# Patient Record
Sex: MALE | Race: WHITE
[De-identification: names, ages, dates, MRNs, and addresses within clinical notes are randomized per-mention and may not be internally consistent; named-entity substitution may affect disease eponyms.]

---

## 2017-05-03 ENCOUNTER — HOSPITAL ENCOUNTER (EMERGENCY)
Dept: HOSPITAL 47 - EC | Age: 52
Discharge: HOME | End: 2017-05-03
Payer: COMMERCIAL

## 2017-05-03 VITALS
SYSTOLIC BLOOD PRESSURE: 147 MMHG | RESPIRATION RATE: 18 BRPM | TEMPERATURE: 98.5 F | HEART RATE: 65 BPM | DIASTOLIC BLOOD PRESSURE: 86 MMHG

## 2017-05-03 DIAGNOSIS — E78.5: ICD-10-CM

## 2017-05-03 DIAGNOSIS — Z79.899: ICD-10-CM

## 2017-05-03 DIAGNOSIS — T15.02XA: Primary | ICD-10-CM
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
## 2017-05-03 DIAGNOSIS — Z79.82: ICD-10-CM

## 2017-05-03 DIAGNOSIS — Z23: ICD-10-CM
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
## 2017-05-03 DIAGNOSIS — Z87.891: ICD-10-CM

## 2017-05-03 PROCEDURE — 65220 REMOVE FOREIGN BODY FROM EYE: CPT

## 2017-05-03 PROCEDURE — 90715 TDAP VACCINE 7 YRS/> IM: CPT

## 2017-05-03 PROCEDURE — 90471 IMMUNIZATION ADMIN: CPT

## 2017-05-03 PROCEDURE — 99283 EMERGENCY DEPT VISIT LOW MDM: CPT

## 2017-05-03 NOTE — ED
General Adult HPI





- General


Chief complaint: ENT


Stated complaint: eye injury-[facility]


Time Seen by Provider: 05/03/17 20:25


Source: patient, RN notes reviewed


Mode of arrival: ambulatory


Limitations: no limitations





- History of Present Illness


Initial comments: 





Patient 51-year-old male who presents emergency room today with a chief 

complaint of foreign body to the left eye.  He does admit that he was grinding 

earlier today at work.  States he did have his safety glasses on.  He states he 

did not feel anything get into his eye at the time.  He states it was not until 

he was driving home that he felt some irritation to the left side.  He states it

's seems a metallic foreign body at the 3 o'clock position. Patient denies any 

recent fever, chills, shortness of breath, chest pain, back pain, abdominal pain

, nausea or vomiting, numbness or tingling, dysuria or hematuria, constipation 

or diarrhea, headaches or visual changes, or any other complaints.





- Related Data


 Home Medications











 Medication  Instructions  Recorded  Confirmed


 


Aspirin 81 mg PO DAILY 08/01/14 05/03/17


 


Pravastatin Sodium [Pravachol] 20 mg PO DAILY 08/01/14 05/03/17


 


Ascorbic Acid [Vitamin C] 1,000 mg PO DAILY 05/03/17 05/03/17


 


Cholecalciferol [Vitamin D3] 1,000 unit PO DAILY 05/03/17 05/03/17


 


Ferrous Sulfate [Feosol] 325 mg PO DAILY 05/03/17 05/03/17


 


Multivitamins, Thera [Multivitamin 1 tab PO DAILY 05/03/17 05/03/17





(formulary)]   


 


Omega-3 Fatty Acids/Fish Oil [Fish 1 cap PO DAILY 05/03/17 05/03/17





Oil 1,000 mg Softgel]   








 Previous Rx's











 Medication  Instructions  Recorded


 


Tobramycin 0.3% Ophth Soln [Tobrex 1 - 2 drop BOTH EYES QID 7 Days 05/03/17





0.3% Ophth Soln]  











 Allergies











Allergy/AdvReac Type Severity Reaction Status Date / Time


 


No Known Allergies Allergy   Verified 05/03/17 20:36














Review of Systems


ROS Statement: 


Those systems with pertinent positive or pertinent negative responses have been 

documented in the HPI.





ROS Other: All systems not noted in ROS Statement are negative.





Past Medical History


Past Medical History: Hyperlipidemia


History of Any Multi-Drug Resistant Organisms: None Reported


Past Surgical History: No Surgical Hx Reported


Past Anesthesia/Blood Transfusion Reactions: No Reported Reaction


Past Psychological History: No Psychological Hx Reported


Smoking Status: Former smoker


Past Alcohol Use History: None Reported, Occasional


Past Drug Use History: None Reported





General Exam





- General Exam Comments


Initial Comments: 





General:  The patient is awake and alert, in no distress, and does not appear 

acutely ill. 


Eye:  Pupils are equal, round and reactive to light, extra-ocular movements are 

intact.  No nystagmus.  There is normal conjunctiva bilaterally.  No signs of 

icterus.  


Ears, nose, mouth and throat:  There are moist mucous membranes and no oral 

lesions. 


Neck:  The neck is supple, there is no tenderness or JVD.  


Cardiovascular:  There is a regular rate and rhythm. No murmur, rub or gallop 

is appreciated.


Respiratory:  Lungs are clear to auscultation, respirations are non-labored, 

breath sounds are equal.  No wheezes, stridor, rales, or rhonchi.


Musculoskeletal:  Normal ROM, no tenderness.  Strength 5/5. Sensation intact. 

Pulses equal bilaterally 2+.  


Neurological:  A&O x 3. CN II-XII intact, There are no obvious motor or sensory 

deficits. Coordination appears grossly intact. Speech is normal.


Skin:  Skin is warm and dry and no rashes or lesions are noted. 


Psychiatric:  Cooperative, appropriate mood & affect, normal judgment.  


Limitations: no limitations





Course


 Vital Signs











  05/03/17





  20:12


 


Temperature 98.5 F


 


Pulse Rate 65


 


Respiratory 18





Rate 


 


Blood Pressure 147/86


 


O2 Sat by Pulse 97





Oximetry 














Procedures





- Procedures


Initial comment: 





Patient does have body located at the 3 o'clock position.  Was anesthetized 

locally with proparacaine.  Does relieve his symptoms.  Fleming brush used to 

remove foreign body.  Rust ring removed.  Patient already procedure well.





Disposition


Clinical Impression: 


 Corneal foreign body





Disposition: HOME SELF-CARE


Condition: Good


Additional Instructions: 


Please use antibiotic drops as prescribed follow-up with ophthalmologist falls 

symptoms have not completely resolved in 2 days.


Prescriptions: 


Tobramycin 0.3% Ophth Soln [Tobrex 0.3% Ophth Soln] 1 - 2 drop BOTH EYES QID 7 

Days


Referrals: 


Maricel Sofia MD [Primary Care Provider] - 1-2 days


Pedro Luis Kenny MD [STAFF PHYSICIAN] - 1-2 days


Time of Disposition: 20:52

## 2018-06-22 ENCOUNTER — HOSPITAL ENCOUNTER (EMERGENCY)
Dept: HOSPITAL 47 - EC | Age: 53
Discharge: HOME | End: 2018-06-22
Payer: COMMERCIAL

## 2018-06-22 VITALS
RESPIRATION RATE: 20 BRPM | HEART RATE: 68 BPM | DIASTOLIC BLOOD PRESSURE: 94 MMHG | SYSTOLIC BLOOD PRESSURE: 161 MMHG | TEMPERATURE: 98.9 F

## 2018-06-22 DIAGNOSIS — Z79.82: ICD-10-CM
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
## 2018-06-22 DIAGNOSIS — Z79.899: ICD-10-CM
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
## 2018-06-22 DIAGNOSIS — S61.210A: Primary | ICD-10-CM

## 2018-06-22 DIAGNOSIS — Z87.891: ICD-10-CM

## 2018-06-22 DIAGNOSIS — E78.5: ICD-10-CM

## 2018-06-22 PROCEDURE — 99283 EMERGENCY DEPT VISIT LOW MDM: CPT

## 2018-06-22 PROCEDURE — 73130 X-RAY EXAM OF HAND: CPT

## 2018-06-22 PROCEDURE — 96372 THER/PROPH/DIAG INJ SC/IM: CPT

## 2018-06-22 PROCEDURE — 12005 RPR S/N/A/GEN/TRK12.6-20.0CM: CPT

## 2018-06-22 NOTE — ED
General Adult HPI





- General


Chief complaint: Wound/Laceration


Stated complaint: [facility] rt index lac


Time Seen by Provider: 06/22/18 17:37


Source: patient, RN notes reviewed


Mode of arrival: ambulatory


Limitations: no limitations





- History of Present Illness


Initial comments: 





52-year-old male presents to the emergency department for a chief complaint of 

laceration to the ring finger of the right hand about one hour ago.  Patient 

states he was using a wrench when it got stuck in a motor and slapped his 

finger.  Patient states he lacerated the finger this way.  Patient last had a 

tetanus shot last year.  Patient denies any other injuries. Patient has no 

other complaints at this time including shortness of breath, chest pain, 

abdominal pain, nausea or vomiting, headache, or visual changes.





- Related Data


 Home Medications











 Medication  Instructions  Recorded  Confirmed


 


Aspirin 81 mg PO DAILY 08/01/14 05/03/17


 


Pravastatin Sodium [Pravachol] 20 mg PO DAILY 08/01/14 05/03/17


 


Ascorbic Acid [Vitamin C] 1,000 mg PO DAILY 05/03/17 05/03/17


 


Cholecalciferol [Vitamin D3] 1,000 unit PO DAILY 05/03/17 05/03/17


 


Ferrous Sulfate [Feosol] 325 mg PO DAILY 05/03/17 05/03/17


 


Multivitamins, Thera [Multivitamin 1 tab PO DAILY 05/03/17 05/03/17





(formulary)]   


 


Omega-3 Fatty Acids/Fish Oil [Fish 1 cap PO DAILY 05/03/17 05/03/17





Oil 1,000 mg Softgel]   








 Previous Rx's











 Medication  Instructions  Recorded


 


Tobramycin 0.3% Ophth Soln [Tobrex 1 - 2 drop BOTH EYES QID 7 Days  ml 05/03/17





0.3% Ophth Soln]  


 


Cephalexin [Keflex] 500 mg PO Q12HR #20 cap 06/22/18











 Allergies











Allergy/AdvReac Type Severity Reaction Status Date / Time


 


No Known Allergies Allergy   Verified 06/22/18 16:52














Review of Systems


ROS Statement: 


Those systems with pertinent positive or pertinent negative responses have been 

documented in the HPI.





ROS Other: All systems not noted in ROS Statement are negative.





Past Medical History


Past Medical History: Hyperlipidemia


History of Any Multi-Drug Resistant Organisms: None Reported


Past Surgical History: No Surgical Hx Reported


Past Anesthesia/Blood Transfusion Reactions: No Reported Reaction


Past Psychological History: No Psychological Hx Reported


Smoking Status: Former smoker


Past Alcohol Use History: Occasional


Past Drug Use History: None Reported





General Exam


Limitations: no limitations


General appearance: alert, in no apparent distress


Head exam: Present: atraumatic, normocephalic, normal inspection


Eye exam: Present: normal appearance


ENT exam: Present: normal exam, mucous membranes moist


Neck exam: Present: normal inspection, full ROM.  Absent: tenderness, 

meningismus, lymphadenopathy


Respiratory exam: Present: normal lung sounds bilaterally.  Absent: respiratory 

distress, wheezes, rales, rhonchi, stridor


Cardiovascular Exam: Present: regular rate, normal rhythm, normal heart sounds.

  Absent: systolic murmur, diastolic murmur, rubs, gallop, clicks


Extremities exam: Present: full ROM (Full range of motion of the second digit 

right hand including flexion and extension at the MCP, PIP, and DIP joints.), 

tenderness (Tenderness to the laceration site.), normal capillary refill (

Refill less than 2 seconds and radial pulse 2+ in the right upper extremity), 

other (There is a 13 cm laceration extending in the second and third web space 

of the medial aspect of the finger and along to the palmar aspect of the finger 

into the distal phalanx.  No foreign bodies noted.  All deep structures intact.

  No tendon exposure or bone exposure.).  Absent: joint swelling





Course


 Vital Signs











  06/22/18





  16:49


 


Temperature 98.9 F


 


Pulse Rate 68


 


Respiratory 20





Rate 


 


Blood Pressure 161/94


 


O2 Sat by Pulse 98





Oximetry 














Procedures





- Procedures


Initial comment: 





Body area: Right second digit


Laceration length: 14 cm 


Foreign bodies: no foreign bodies 


Tendon involvement: none 


Nerve involvement: none


 Vascular damage: no


 Anesthesia: local infiltration 


Local anesthetic: 9 mL 1% lidocaine 


Preparation: Patient was prepped and draped in the usual sterile fashion. 


Irrigation solution: 1 L sterile water


Irrigation method:sterile water jet lavage 1 L


Skin closure:5-0 Ethilon using sterile technique


Number of sutures: 32


Technique: 31 interupted, 1 verticle mattress  


Dressing: antibiotic ointment/ gauze 


Patient tolerance: Patient tolerated the procedure well with no immediate 

complications.





Medical Decision Making





- Medical Decision Making





52-year-old male presents to the emergency department for a chief complaint of 

laceration to the right second digit.  Patient accidentally cut it with a 

wrench.  Tetanus up-to-date.  Patient has full range of motion of all joints in 

the second digit.  No rings on the right hand.  Neurovascular intact in the 

second digit.  Dr. Morse visualized the laceration before suturing.  X-ray 

showed no fractures or dislocations.  Wound was soaked in water and iodine.  

Wound was then irrigated with 1 L of sterile water.  It was then cleaned with 

iodine and 32 sutures were applied.  After sutures applied patient was able to 

bend the finger.  Patient was given a shot of Ancef and was given oral Keflex.  

Patient was educated that due to the flap-like nature of the laceration it is 

possible it won't heal well because of compromised blood supply.  Patient was 

educated on signs of infection.  His hand was wrapped with bacitracin and tube 

gauze.  Splint was made to keep patient from bending the finger until it can 

heal.  He was referred to the hand surgeon Dr. Berrios.  He will follow up 

with him.  He is to return to the emergency department if you have any 

worsening symptoms.





Disposition


Clinical Impression: 


 Laceration





Disposition: HOME SELF-CARE


Condition: Good


Instructions:  Care For Your Stitches (ED), Laceration (ED)


Additional Instructions: 


Please take antibiotic as directed.  Please follow-up with orthopedics on 

Monday.  Please return to the emergency department if you have any worsening 

symptoms or notice signs of infection such as spreading redness, streaking 

redness up the finger, drainage, or fever.


Prescriptions: 


Cephalexin [Keflex] 500 mg PO Q12HR #20 cap


Is patient prescribed a controlled substance at d/c from ED?: No


Referrals: 


Maricel Sofia MD [Primary Care Provider] - 1-2 days


Oziel Berrios DO [Doctor of Osteopathic Medicine] - 1-2 days


Time of Disposition: 20:12

## 2018-06-22 NOTE — XR
EXAMINATION TYPE: XR hand complete RT

 

DATE OF EXAM: 6/22/2018

 

COMPARISON: NONE

 

HISTORY: Second digit laceration and pain

 

TECHNIQUE: 3 views

 

FINDINGS: There is some soft tissue deformity at the proximal aspect of the index finger consistent w
ith laceration. There is deformity of the tuft of the distal phalanx of the ring finger consistent wi
th old healed fracture. I see no fracture nor dislocation. There is a 1 mm density anterior to the mi
ddle phalanx of the index finger that could be a small foreign body. There is some deformity of the p
roximal phalanx of the little finger related to old healed fracture.

 

IMPRESSION: Soft tissue deformity. Possible small foreign body. No fracture seen.

## 2024-11-02 ENCOUNTER — HOSPITAL ENCOUNTER (OUTPATIENT)
Dept: HOSPITAL 47 - RADMRIMAIN | Age: 59
Discharge: HOME | End: 2024-11-02
Attending: UROLOGY
Payer: COMMERCIAL

## 2024-11-02 DIAGNOSIS — N40.0: Primary | ICD-10-CM

## 2024-11-02 DIAGNOSIS — R97.20: ICD-10-CM

## 2024-11-02 PROCEDURE — 72197 MRI PELVIS W/O & W/DYE: CPT
